# Patient Record
Sex: FEMALE | Race: WHITE | NOT HISPANIC OR LATINO
[De-identification: names, ages, dates, MRNs, and addresses within clinical notes are randomized per-mention and may not be internally consistent; named-entity substitution may affect disease eponyms.]

---

## 2021-08-24 PROBLEM — Z00.00 ENCOUNTER FOR PREVENTIVE HEALTH EXAMINATION: Status: ACTIVE | Noted: 2021-08-24

## 2021-09-14 ENCOUNTER — APPOINTMENT (OUTPATIENT)
Dept: ORTHOPEDIC SURGERY | Facility: CLINIC | Age: 56
End: 2021-09-14
Payer: COMMERCIAL

## 2021-09-14 ENCOUNTER — OUTPATIENT (OUTPATIENT)
Dept: OUTPATIENT SERVICES | Facility: HOSPITAL | Age: 56
LOS: 1 days | End: 2021-09-14
Payer: COMMERCIAL

## 2021-09-14 ENCOUNTER — RESULT REVIEW (OUTPATIENT)
Age: 56
End: 2021-09-14

## 2021-09-14 VITALS
WEIGHT: 157 LBS | OXYGEN SATURATION: 98 % | BODY MASS INDEX: 24.64 KG/M2 | SYSTOLIC BLOOD PRESSURE: 124 MMHG | HEIGHT: 67 IN | HEART RATE: 89 BPM | DIASTOLIC BLOOD PRESSURE: 80 MMHG

## 2021-09-14 DIAGNOSIS — Z60.2 PROBLEMS RELATED TO LIVING ALONE: ICD-10-CM

## 2021-09-14 DIAGNOSIS — Z78.9 OTHER SPECIFIED HEALTH STATUS: ICD-10-CM

## 2021-09-14 DIAGNOSIS — M17.10 UNILATERAL PRIMARY OSTEOARTHRITIS, UNSPECIFIED KNEE: ICD-10-CM

## 2021-09-14 DIAGNOSIS — Z80.0 FAMILY HISTORY OF MALIGNANT NEOPLASM OF DIGESTIVE ORGANS: ICD-10-CM

## 2021-09-14 DIAGNOSIS — Z86.59 PERSONAL HISTORY OF OTHER MENTAL AND BEHAVIORAL DISORDERS: ICD-10-CM

## 2021-09-14 DIAGNOSIS — Z82.62 FAMILY HISTORY OF OSTEOPOROSIS: ICD-10-CM

## 2021-09-14 DIAGNOSIS — M16.10 UNILATERAL PRIMARY OSTEOARTHRITIS, UNSPECIFIED HIP: ICD-10-CM

## 2021-09-14 DIAGNOSIS — Z80.3 FAMILY HISTORY OF MALIGNANT NEOPLASM OF BREAST: ICD-10-CM

## 2021-09-14 DIAGNOSIS — Z80.6 FAMILY HISTORY OF LEUKEMIA: ICD-10-CM

## 2021-09-14 DIAGNOSIS — Z82.61 FAMILY HISTORY OF ARTHRITIS: ICD-10-CM

## 2021-09-14 PROCEDURE — 72170 X-RAY EXAM OF PELVIS: CPT

## 2021-09-14 PROCEDURE — 99204 OFFICE O/P NEW MOD 45 MIN: CPT

## 2021-09-14 PROCEDURE — 73564 X-RAY EXAM KNEE 4 OR MORE: CPT

## 2021-09-14 PROCEDURE — 72170 X-RAY EXAM OF PELVIS: CPT | Mod: 26

## 2021-09-14 PROCEDURE — 73564 X-RAY EXAM KNEE 4 OR MORE: CPT | Mod: 26,50

## 2021-09-14 SDOH — SOCIAL STABILITY - SOCIAL INSECURITY: PROBLEMS RELATED TO LIVING ALONE: Z60.2

## 2021-09-14 NOTE — REVIEW OF SYSTEMS
[Joint Pain] : joint pain [Headache] : headache [Anxiety] : anxiety [Depression] : depression [Negative] : Heme/Lymph

## 2021-09-15 PROBLEM — Z78.9 CURRENT NON-SMOKER: Status: ACTIVE | Noted: 2021-09-14

## 2021-09-15 PROBLEM — M16.10 ARTHRITIS, HIP: Status: RESOLVED | Noted: 2021-09-14 | Resolved: 2021-09-15

## 2021-09-15 PROBLEM — Z78.9 DOES NOT USE ILLICIT DRUGS: Status: ACTIVE | Noted: 2021-09-14

## 2021-09-15 PROBLEM — M17.10 ARTHRITIS OF KNEE: Status: RESOLVED | Noted: 2021-09-14 | Resolved: 2021-09-15

## 2021-09-15 PROBLEM — Z80.6 FAMILY HISTORY OF LEUKEMIA: Status: ACTIVE | Noted: 2021-09-14

## 2021-09-15 PROBLEM — Z82.62 FAMILY HISTORY OF OSTEOPOROSIS: Status: ACTIVE | Noted: 2021-09-14

## 2021-09-15 PROBLEM — Z80.0 FAMILY HISTORY OF MALIGNANT NEOPLASM OF COLON: Status: ACTIVE | Noted: 2021-09-14

## 2021-09-15 PROBLEM — Z78.9 SOCIAL ALCOHOL USE: Status: ACTIVE | Noted: 2021-09-14

## 2021-09-15 PROBLEM — Z80.3 FAMILY HISTORY OF MALIGNANT NEOPLASM OF BREAST: Status: ACTIVE | Noted: 2021-09-14

## 2021-09-15 PROBLEM — Z86.59 HISTORY OF BIPOLAR DISORDER: Status: RESOLVED | Noted: 2021-09-14 | Resolved: 2021-09-15

## 2021-09-15 PROBLEM — Z78.9 EXERCISES DAILY: Status: ACTIVE | Noted: 2021-09-14

## 2021-09-15 PROBLEM — Z82.61 FAMILY HISTORY OF ARTHRITIS: Status: ACTIVE | Noted: 2021-09-14

## 2021-09-15 PROBLEM — Z60.2 LIVES ALONE: Status: ACTIVE | Noted: 2021-09-14

## 2021-09-15 RX ORDER — RISPERIDONE 1 MG/1
1 TABLET ORAL
Refills: 0 | Status: ACTIVE | COMMUNITY

## 2021-09-15 RX ORDER — LAMOTRIGINE 100 MG/1
100 TABLET, EXTENDED RELEASE ORAL
Refills: 0 | Status: ACTIVE | COMMUNITY

## 2021-09-15 RX ORDER — CLONAZEPAM 0.5 MG/1
0.5 TABLET ORAL
Refills: 0 | Status: ACTIVE | COMMUNITY

## 2021-09-15 RX ORDER — CHROMIUM 200 MCG
TABLET ORAL
Refills: 0 | Status: ACTIVE | COMMUNITY

## 2021-09-15 RX ORDER — LAMOTRIGINE 100 MG/1
100 TABLET ORAL
Qty: 180 | Refills: 0 | Status: ACTIVE | COMMUNITY
Start: 2021-08-17

## 2021-09-15 RX ORDER — OLANZAPINE 20 MG/1
TABLET, FILM COATED ORAL
Refills: 0 | Status: ACTIVE | COMMUNITY

## 2021-09-15 NOTE — DISCUSSION/SUMMARY
[de-identified] : 57y/o female with right total hip and right total knee replacements, left knee osteoarthritis\par - PT\par - HEP encouraged\par - Tylenol + diclofenac as needed\par - Discussed left knee injections but she elected to hold off for now\par - Follow up annually with bilateral knee and right hip XRs or earlier as needed. Based on severity of symptoms, she will probably need to undergo left TKA before considering right knee revision (femoral and patellar components).

## 2021-09-15 NOTE — HISTORY OF PRESENT ILLNESS
[___ wks] : [unfilled] week(s) ago [4] : a current pain level of 4/10 [Bending] : worsened by bending [Acetaminophen] : relieved by acetaminophen [Ice] : relieved by ice [Rest] : relieved by rest [de-identified] : 9/14/21: 55y/o female presenting for evaluation of L > R knee pain. Right knee underwent ACL reconstruction in 1983 and was replaced in 2015. She has been experiencing knee hyperextension since the surgery and has persistent soft tissue swelling. Left knee has known osteoarthritis. Last did PT in 2015 immediately following the R TKA. Only other treatments have consisted of Tylenol, cryo, and home exercise (walks 30-60min/day, does light home calisthenics). Reports occasional left knee buckling. Pain on both sides is localized mostly to the anteromedial aspect of both knees. On the right side, there is some radiation down the anteromedial leg to the posteromedial ankle. \par \par PMH sig for bipolar disorder on multiple psychotropic medications. PSH otherwise significant for R ANA in 2017. She had 2x early dislocations following the ANA and was revised to a dual mobility construct later that year with resolution of the instability. She remains very apprehensive regarding extreme motions of the right hip and admits that she has not been pushing the envelope at all with respect to stretching exercises since then. [de-identified] : patient describes pain as radiating and shooting when she feels it.

## 2021-09-15 NOTE — PHYSICAL EXAM
[de-identified] : General appearance: well nourished and hydrated, pleasant, alert and oriented x 3, cooperative.  \par HEENT: normocephalic, EOM intact, wearing mask, external auditory canal clear.  \par Cardiovascular: no lower leg edema, no varicosities, dorsalis pedis pulses palpable and symmetric.  \par Lymphatics: no palpable lymphadenopathy, no lymphedema.  \par Neurologic: sensation is normal, no muscle weakness in upper or lower extremities, patella tendon reflexes present and symmetric.  \par Dermatologic: skin moist, warm, no rash.  \par Spine: cervical spine with normal lordosis and painless range of motion, thoracic spine with normal kyphosis and painless range of motion, lumbosacral spine with normal lordosis and painless range of motion.  \par Gait: normal.\par \par Left knee:\par - Soft tissue swelling: mild\par - Ecchymosis: none\par - Erythema: none\par - Effusion: small\par - Wounds: none\par - Alignment: normal\par - Tenderness: none\par - ROM: 0-130\par - Collateral laxity: none\par - Cruciate laxity: none\par - Popliteal angle (degrees): 60\par - Quad strength: 5/5\par \par Right knee:\par - Soft tissue swelling: moderate\par - Ecchymosis: none\par - Erythema: none\par - Effusion: small\par - Wounds: healed midline incision, short lateral femoral incision\par - Alignment: normal\par - Tenderness: mild anteromedial joint line\par - ROM: 10 hyper - 120\par - Collateral laxity: none\par - Cruciate laxity: about 5mm ant/post translation on flexion drawers\par - Popliteal angle (degrees): 60\par - Quad strength: 5/5\par \par Right hip:\par - Swelling: none\par - Ecchymosis: none\par - Erythema: none\par - Wounds: healed posterolateral incision\par - Tenderness: none\par - ROM: 100 flexion, 0 extension, 10 adduction, 30 abduction, 10 internal rotation, 35-40 external rotation\par - AURORA: apprehensive and stiff\par - FADIR: apprehensive and stiff\par - Tarah: negative\par - Stinchfield: uncomfortable\par - Flexor power: 5/5\par - Abductor power: 5/5 [de-identified] : AP pelvis and 4 views of the bilateral knees (weightbearing AP, weightbearing Subramanian, weightbearing lateral, and Sunrise) were obtained today, interpreted by me, and reviewed with the patient.\par \par Pelvic alignment: normal\par \par Right hip -- ANA in position. Dual mobility construct. All components appear well fixed in acceptable alignment. No evidence of mechanical complication.\par \par Left hip --\par Alignment: normal\par Arthritis: none\par Deformity: none\par Osteonecrosis: none\par \par Right knee -- TKA in position (cemented PS Legion with resurfaced patella). Tibial and femoral components appear to be well fixed in acceptable alignment. Patella was undercut medially and the anterior compartment appears overstuffed. The patella sits at appropriate height and tracks centrally. There is some bony erosion of the lateral patella where is articulates with the femoral trochlea. Retained lateral femoral condylar staples are visualized, consistent with prior ACL reconstruction.\par \par Left knee --\par Alignment: normal\par Arthritis: moderate/severe tricompartmental, worst lateral, KL3-4\par Patellar height: normal\par Patellar tracking: central

## 2021-12-13 ENCOUNTER — RX RENEWAL (OUTPATIENT)
Age: 56
End: 2021-12-13

## 2022-07-03 ENCOUNTER — RX RENEWAL (OUTPATIENT)
Age: 57
End: 2022-07-03

## 2022-09-02 ENCOUNTER — APPOINTMENT (OUTPATIENT)
Dept: ORTHOPEDIC SURGERY | Facility: CLINIC | Age: 57
End: 2022-09-02

## 2022-09-16 ENCOUNTER — APPOINTMENT (OUTPATIENT)
Dept: ORTHOPEDIC SURGERY | Facility: CLINIC | Age: 57
End: 2022-09-16

## 2022-10-01 ENCOUNTER — RX RENEWAL (OUTPATIENT)
Age: 57
End: 2022-10-01

## 2022-10-05 ENCOUNTER — APPOINTMENT (OUTPATIENT)
Dept: ORTHOPEDIC SURGERY | Facility: CLINIC | Age: 57
End: 2022-10-05

## 2022-10-05 ENCOUNTER — RESULT REVIEW (OUTPATIENT)
Age: 57
End: 2022-10-05

## 2022-10-05 ENCOUNTER — OUTPATIENT (OUTPATIENT)
Dept: OUTPATIENT SERVICES | Facility: HOSPITAL | Age: 57
LOS: 1 days | End: 2022-10-05
Payer: COMMERCIAL

## 2022-10-05 VITALS
OXYGEN SATURATION: 97 % | HEART RATE: 86 BPM | BODY MASS INDEX: 24.64 KG/M2 | DIASTOLIC BLOOD PRESSURE: 91 MMHG | WEIGHT: 157 LBS | SYSTOLIC BLOOD PRESSURE: 143 MMHG | HEIGHT: 67 IN

## 2022-10-05 PROCEDURE — 73564 X-RAY EXAM KNEE 4 OR MORE: CPT | Mod: 26,50

## 2022-10-05 PROCEDURE — 99213 OFFICE O/P EST LOW 20 MIN: CPT

## 2022-10-05 PROCEDURE — 73521 X-RAY EXAM HIPS BI 2 VIEWS: CPT | Mod: 26

## 2022-10-05 PROCEDURE — 73564 X-RAY EXAM KNEE 4 OR MORE: CPT

## 2022-10-05 PROCEDURE — 73521 X-RAY EXAM HIPS BI 2 VIEWS: CPT

## 2022-10-05 RX ORDER — DICLOFENAC SODIUM 1% 10 MG/G
1 GEL TOPICAL
Qty: 100 | Refills: 2 | Status: ACTIVE | COMMUNITY
Start: 2022-10-05 | End: 1900-01-01

## 2022-10-06 NOTE — HISTORY OF PRESENT ILLNESS
[de-identified] : R TKA 2015, OSH\par R ANA 2017, OSH, with 2x early dislocations, subsequently revised to DM\par \par 10/5/22: 58 y/o female presenting for f/u of right TKA, right ANA, and left knee OA. She was last seen here about 1 year ago. She reports that the knees are overall relatively painless. She is able to continue with her usual exercise program of up to 45-60 minutes of walking daily. She participated for some period of time in PT but stopped after her insurance cut her off and she is continuing to do an appropriate home exercise program. She takes diclofenac up to once daily in the mornings as needed. She reports a sensation of some muscular weakness/"looseness" of right hip which can be uncomfortable. \par \par 9/14/21: 55y/o female presenting for evaluation of L > R knee pain. Right knee underwent ACL reconstruction in 1983 and was replaced in 2015. She has been experiencing knee hyperextension since the surgery and has persistent soft tissue swelling. Left knee has known osteoarthritis. Last did PT in 2015 immediately following the R TKA. Only other treatments have consisted of Tylenol, cryo, and home exercise (walks 30-60min/day, does light home calisthenics). Reports occasional left knee buckling. Pain on both sides is localized mostly to the anteromedial aspect of both knees. On the right side, there is some radiation down the anteromedial leg to the posteromedial ankle. \par \par PMH sig for bipolar disorder on multiple psychotropic medications. PSH otherwise significant for R ANA in 2017. She had 2x early dislocations following the ANA and was revised to a dual mobility construct later that year with resolution of the instability. She remains very apprehensive regarding extreme motions of the right hip and admits that she has not been pushing the envelope at all with respect to stretching exercises since then.

## 2022-10-06 NOTE — DISCUSSION/SUMMARY
[de-identified] : 58 y/o female with well-functioning right ANA and right TKA as well as minimally symptomatic left knee OA\par - She is doing well. I do not feel that we need to discuss any further surgical intervention at this time\par - She will continue with a PT program as well as a dedicated home exercise program for both the hip and the knees. \par - She was encouraged to continue with her once daily diclofenac as well as topical diclofenac over the trochanteric bursitis on the right side. \par - We will continue to f/u annually with repeat b/l hip and knee XR or earlier as needed, particularly for any persistent or worsening left knee pain.

## 2022-10-06 NOTE — END OF VISIT
[FreeTextEntry3] : All medical record entries made by the Scribe were at my, Dr. Bony Sheppard, direction and personally dictated by me on 10/05/2022. I have reviewed the chart and agree that the record accurately reflects my personal performance of the history, physical exam, assessment and plan. I have also personally directed, reviewed, and agreed with the chart.

## 2022-10-06 NOTE — ADDENDUM
[FreeTextEntry1] : Documented by Damari Vasquez acting as a scribe for Dr. Bony Sheppard on 10/05/2022.

## 2022-10-06 NOTE — PHYSICAL EXAM
[de-identified] : General appearance: well nourished and hydrated, pleasant, alert and oriented x 3, cooperative.  \par HEENT: normocephalic, EOM intact, wearing mask, external auditory canal clear.  \par Cardiovascular: no lower leg edema, no varicosities, dorsalis pedis pulses palpable and symmetric.  \par Lymphatics: no palpable lymphadenopathy, no lymphedema.  \par Neurologic: sensation is normal, no muscle weakness in upper or lower extremities, patella tendon reflexes present and symmetric.  \par Dermatologic: skin moist, warm, no rash.  \par Spine: cervical spine with normal lordosis and painless range of motion, thoracic spine with normal kyphosis and painless range of motion, lumbosacral spine with normal lordosis and painless range of motion.  \par Gait: normal.\par \par Left knee:\par - Soft tissue swelling: none\par - Ecchymosis: none\par - Erythema: none\par - Effusion: none\par - Wounds: none\par - Alignment: normal\par - Tenderness: none\par - ROM: 0-135\par - Collateral laxity: none\par - Cruciate laxity: none\par - Popliteal angle (degrees): 30\par - Quad strength: 5/5\par \par Right knee:\par - Soft tissue swelling: moderate\par - Ecchymosis: none\par - Erythema: none\par - Effusion: small\par - Wounds: well-healed anterior surgical incision which is benign appearing \par - Alignment: mild valgus\par - Tenderness: none\par - ROM: 10 hyper - 125\par - Collateral laxity: mild medial ligamentous laxity with a firm end point \par - Cruciate laxity: stable\par - Popliteal angle (degrees): 30\par - Quad strength: 5/5\par \par Right hip:\par - Swelling: none\par - Ecchymosis: none\par - Erythema: none\par - Wounds: healed posterolateral incision as well as pelvic stab incisions which are benign appearing \par - Tenderness: mild TTP along greater trochanter, nontender over groin\par - ROM: 105 flexion, 0 extension, 15 adduction, 35 abduction, 15 internal rotation, 35 external rotation\par - AURORA: negative\par - FADIR: lateral trochanteric pain\par - Tarah: negative\par - Stinchfield: negative \par - Flexor power: 5/5\par - Abductor power: 5/5 PAST MEDICAL HISTORY:  No pertinent past medical history      [de-identified] : AP pelvis and 4 views of the bilateral knees (weightbearing AP, weightbearing Subramanian, weightbearing lateral, and Sunrise) were interpreted by me and reviewed with the patient.\par \par Location of imaging: [facility]\par Date of exam: 10/5/22\par \par Pelvic alignment: normal, there is evidence of degenerative lumbar scoliosis\par \par Right hip -- well-fixed AAN with unchanged alignment as compared to previous imaging without evidence of mechanical complication\par \par Left hip -- \par Arthritis: none\par Deformity: cam\par Osteonecrosis: none\par \par Right knee -- right TKA with retained femoral ACL staples, all hardware in unchanged position as compared to previous imaging without evidence of new mechanical complication \par Alignment: valgus \par Patellar height: normal\par Patellar tracking: central with grossly unchanged appearance of lateral patellar facet bone loss where it articulates with the lateral trochlear aspect of the femoral component\par \par Left knee -- \par Alignment: normal\par Arthritis: tricompartmental OA most pronounced laterally, KL 3-4\par Patellar height: normal\par Patellar tracking: central

## 2023-04-07 ENCOUNTER — RX RENEWAL (OUTPATIENT)
Age: 58
End: 2023-04-07

## 2023-06-20 ENCOUNTER — RESULT REVIEW (OUTPATIENT)
Age: 58
End: 2023-06-20

## 2023-06-20 ENCOUNTER — OUTPATIENT (OUTPATIENT)
Dept: OUTPATIENT SERVICES | Facility: HOSPITAL | Age: 58
LOS: 1 days | End: 2023-06-20
Payer: COMMERCIAL

## 2023-06-20 ENCOUNTER — APPOINTMENT (OUTPATIENT)
Dept: ORTHOPEDIC SURGERY | Facility: CLINIC | Age: 58
End: 2023-06-20
Payer: MEDICAID

## 2023-06-20 VITALS
HEART RATE: 81 BPM | BODY MASS INDEX: 24.64 KG/M2 | HEIGHT: 67 IN | DIASTOLIC BLOOD PRESSURE: 98 MMHG | WEIGHT: 157 LBS | OXYGEN SATURATION: 97 % | SYSTOLIC BLOOD PRESSURE: 163 MMHG

## 2023-06-20 DIAGNOSIS — Z96.641 AFTERCARE FOLLOWING JOINT REPLACEMENT SURGERY: ICD-10-CM

## 2023-06-20 DIAGNOSIS — Z96.653 AFTERCARE FOLLOWING JOINT REPLACEMENT SURGERY: ICD-10-CM

## 2023-06-20 DIAGNOSIS — Z47.1 AFTERCARE FOLLOWING JOINT REPLACEMENT SURGERY: ICD-10-CM

## 2023-06-20 DIAGNOSIS — M17.12 UNILATERAL PRIMARY OSTEOARTHRITIS, LEFT KNEE: ICD-10-CM

## 2023-06-20 DIAGNOSIS — Z96.651 AFTERCARE FOLLOWING JOINT REPLACEMENT SURGERY: ICD-10-CM

## 2023-06-20 PROCEDURE — 73521 X-RAY EXAM HIPS BI 2 VIEWS: CPT

## 2023-06-20 PROCEDURE — 73564 X-RAY EXAM KNEE 4 OR MORE: CPT

## 2023-06-20 PROCEDURE — 99214 OFFICE O/P EST MOD 30 MIN: CPT

## 2023-06-20 PROCEDURE — 73564 X-RAY EXAM KNEE 4 OR MORE: CPT | Mod: 26,50

## 2023-06-20 PROCEDURE — 73521 X-RAY EXAM HIPS BI 2 VIEWS: CPT | Mod: 26

## 2023-06-22 PROBLEM — Z47.1 AFTERCARE FOLLOWING RIGHT HIP JOINT REPLACEMENT SURGERY: Noted: 2021-09-14

## 2023-06-22 PROBLEM — Z47.1 AFTERCARE FOLLOWING RIGHT KNEE JOINT REPLACEMENT SURGERY: Noted: 2021-09-14

## 2023-06-22 PROBLEM — Z47.1 AFTERCARE FOLLOWING BILATERAL KNEE JOINT REPLACEMENT SURGERY: Noted: 2023-06-22

## 2023-06-22 PROBLEM — M17.12 PRIMARY OSTEOARTHRITIS OF LEFT KNEE: Noted: 2021-09-14

## 2023-06-22 NOTE — END OF VISIT
[FreeTextEntry3] : All medical record entries made by the Scribe were at my, Dr. Bony Sheppard, direction and personally dictated by me on 06/20/2023. I have reviewed the chart and agree that the record accurately reflects my personal performance of the history, physical exam, assessment and plan. I have also personally directed, reviewed, and agreed with the chart.

## 2023-06-22 NOTE — DISCUSSION/SUMMARY
[de-identified] : 57 y/o female s/p right ANA and right TKA with left hip trochanteric bursitis and at this time minimally symptomatic left knee OA. \par - She is doing well at this time. She has relatively mild b/l trochanteric bursitis which she feels is under adequate control without the need for invasive treatments. She is maintaining what I would consider to be a robust HEP. I did recommend that she specifically add in posterior chain stretching exercises which I demonstrated for her, but beyond that I think no additional intervention is required at this point. \par - We reviewed that if the left knee OA becomes more symptomatic then this can be addressed with injections or surgery which are not necessary at this point. \par - She will continue to f/u annually with repeat bilateral hip and knee XR or earlier as needed for new or worsening symptoms.

## 2023-06-22 NOTE — ADDENDUM
[FreeTextEntry1] : Documented by Damari Vasquez acting as a scribe for Dr. Bony Sheppard on 06/20/2023.

## 2023-06-22 NOTE — PHYSICAL EXAM
[de-identified] : General appearance: well nourished and hydrated, pleasant, alert and oriented x 3, cooperative.  \par HEENT: normocephalic, EOM intact, wearing mask, external auditory canal clear.  \par Cardiovascular: no lower leg edema, no varicosities, dorsalis pedis pulses palpable and symmetric.  \par Lymphatics: no palpable lymphadenopathy, no lymphedema.  \par Neurologic: sensation is normal, no muscle weakness in upper or lower extremities, patella tendon reflexes present and symmetric.  \par Dermatologic: skin moist, warm, no rash.  \par Spine: cervical spine with normal lordosis and painless range of motion, thoracic spine with normal kyphosis and painless range of motion, lumbosacral spine with normal lordosis and painless range of motion.  No tenderness to palpation along midline spine and paraspinal musculature.  Sacroiliac joints nontender bilaterally. Negative SLR and crossed SLR tests bilaterally.\par Gait: no assistive device, stable nonantalgic gait pattern \par \par Left knee: \par - Focal soft tissue swelling: none\par - Ecchymosis: none\par - Erythema: none\par - Effusion: trace, no Baker's cyst\par - Wounds: none\par - Alignment: normal\par - Tenderness: none along the pes anserine bursa\par - ROM: 0-130\par - Collateral laxity: none\par - Cruciate laxity: none\par - Popliteal angle (degrees): 40\par - Quad strength: 5/5\par - She has medial hamstring snapping which is elicitable going from 10-25 degrees of knee flexion which does not appear to be painful to her. \par \par Right knee:\par - Focal soft tissue swelling: none\par - Ecchymosis: none\par - Erythema: none\par - Effusion: trace, no Baker's cyst\par - Wounds: well healed diagonally midline incision, benign appearing \par - Alignment: valgus\par - Tenderness: none\par - ROM: 0-120\par - Collateral laxity: none\par - Cruciate laxity: none\par - Popliteal angle (degrees): 40\par - Quad strength: 5/5\par \par Limb lengths: clinically RLE approximately 6-8 mm longer than LLE\par \par Left hip:\par - Focal soft tissue swelling: none\par - Ecchymosis: none\par - Erythema: none\par - Wounds: none\par - Tenderness: TTP over the lateral trochanter, no TTP over the anterior hip\par - ROM: \par   - Flexion: 120\par   - Extension: 0\par   - Adduction: 15\par   - Abduction: 40\par   - Internal rotation in 90 degrees of hip flexion: 10\par   - External rotation in 90 degrees of hip flexion: 30\par - AURORA: elicits lateral hip pain\par - FADIR: elicits lateral hip pain\par - Tarah: negative\par - Stinchfield: negative\par - Flexor power: 5/5\par - Abductor power: 5/5\par \par Right hip: \par - Focal soft tissue swelling: none\par - Ecchymosis: none\par - Erythema: none\par - Wounds: well healed posterolateral incision, benign appearing\par - Tenderness: none\par - ROM: \par   - Flexion: 105\par   - Extension: 0\par   - Adduction: 15\par   - Abduction: 20\par   - Internal rotation in 90 degrees of hip flexion: 15\par   - External rotation in 90 degrees of hip flexion: 25\par - AURORA: stiff but negative \par - FADIR: stiff but negative \par - Tarah: negative\par - Stinchfield: negative\par - Flexor power: 5/5\par - Abductor power: 5/5 [de-identified] : B/l hip and knee XR were taken. These demonstrate stable appearance of her right ANA as compared to previous imaging without evidence of mechanical complication. Left hip demonstrates minimal OA, Tonnis 0-1 with no associated osteonecrosis or deformity. Right knee demonstrates right TKA in stable position with retained staples, no evidence of mechanical complication as compared to previous imaging. Patella sits at normal height and tracks centrally with increased lateral tilt. As before, the patella is articulating with the lateral femoral condyle of the implant. Left knee demonstrates normal alignment, tricompartmental OA with an ACL deficient pattern and lateral greater than medial compartment wear, KL 3-4. Patella sits at normal height and tracks centrally.

## 2023-06-22 NOTE — HISTORY OF PRESENT ILLNESS
[de-identified] : R TKA 2015, OSH\par R ANA 2017, OSH, with 2x early dislocations, subsequently revised to DM\par \par 06/20/2023: 59 y/o female f/u for annual f/u of right ANA and right TKA. She states today she has pain to the left hip localized to the lateral greater trochanter with radiation to her midthigh. She rates her pain as 3-4/10. She takes oral and topical diclofenac. She has been doing her HEP and notes muscle pain of the right hip. She denies right knee problems. She no longer participates in PT due to lack of insurance coverage. She denies low back pain. She states her hip pain is primarily provoked by walking though she denies walking distance limitation. \par \par 10/5/22: 58 y/o female presenting for f/u of right TKA, right ANA, and left knee OA. She was last seen here about 1 year ago. She reports that the knees are overall relatively painless. She is able to continue with her usual exercise program of up to 45-60 minutes of walking daily. She participated for some period of time in PT but stopped after her insurance cut her off and she is continuing to do an appropriate home exercise program. She takes diclofenac up to once daily in the mornings as needed. She reports a sensation of some muscular weakness/"looseness" of right hip which can be uncomfortable. \par \par 9/14/21: 57y/o female presenting for evaluation of L > R knee pain. Right knee underwent ACL reconstruction in 1983 and was replaced in 2015. She has been experiencing knee hyperextension since the surgery and has persistent soft tissue swelling. Left knee has known osteoarthritis. Last did PT in 2015 immediately following the R TKA. Only other treatments have consisted of Tylenol, cryo, and home exercise (walks 30-60min/day, does light home calisthenics). Reports occasional left knee buckling. Pain on both sides is localized mostly to the anteromedial aspect of both knees. On the right side, there is some radiation down the anteromedial leg to the posteromedial ankle. \par \par PMH sig for bipolar disorder on multiple psychotropic medications. PSH otherwise significant for R ANA in 2017. She had 2x early dislocations following the ANA and was revised to a dual mobility construct later that year with resolution of the instability. She remains very apprehensive regarding extreme motions of the right hip and admits that she has not been pushing the envelope at all with respect to stretching exercises since then.

## 2023-08-30 ENCOUNTER — RX RENEWAL (OUTPATIENT)
Age: 58
End: 2023-08-30

## 2023-11-27 ENCOUNTER — RX RENEWAL (OUTPATIENT)
Age: 58
End: 2023-11-27

## 2023-12-26 ENCOUNTER — OUTPATIENT (OUTPATIENT)
Dept: OUTPATIENT SERVICES | Facility: HOSPITAL | Age: 58
LOS: 1 days | End: 2023-12-26
Payer: COMMERCIAL

## 2023-12-26 ENCOUNTER — APPOINTMENT (OUTPATIENT)
Dept: ORTHOPEDIC SURGERY | Facility: CLINIC | Age: 58
End: 2023-12-26
Payer: MEDICAID

## 2023-12-26 ENCOUNTER — RESULT REVIEW (OUTPATIENT)
Age: 58
End: 2023-12-26

## 2023-12-26 VITALS
DIASTOLIC BLOOD PRESSURE: 106 MMHG | HEIGHT: 67 IN | OXYGEN SATURATION: 97 % | WEIGHT: 157 LBS | SYSTOLIC BLOOD PRESSURE: 169 MMHG | BODY MASS INDEX: 24.64 KG/M2 | HEART RATE: 92 BPM

## 2023-12-26 VITALS — OXYGEN SATURATION: 97 % | WEIGHT: 157 LBS | HEIGHT: 67 IN | BODY MASS INDEX: 24.64 KG/M2 | HEART RATE: 92 BPM

## 2023-12-26 DIAGNOSIS — Z96.651 AFTERCARE FOLLOWING JOINT REPLACEMENT SURGERY: ICD-10-CM

## 2023-12-26 DIAGNOSIS — M17.12 UNILATERAL PRIMARY OSTEOARTHRITIS, LEFT KNEE: ICD-10-CM

## 2023-12-26 DIAGNOSIS — Z96.641 AFTERCARE FOLLOWING JOINT REPLACEMENT SURGERY: ICD-10-CM

## 2023-12-26 DIAGNOSIS — Z47.1 AFTERCARE FOLLOWING JOINT REPLACEMENT SURGERY: ICD-10-CM

## 2023-12-26 DIAGNOSIS — M70.61 TROCHANTERIC BURSITIS, RIGHT HIP: ICD-10-CM

## 2023-12-26 PROCEDURE — 73521 X-RAY EXAM HIPS BI 2 VIEWS: CPT | Mod: 26

## 2023-12-26 PROCEDURE — 73564 X-RAY EXAM KNEE 4 OR MORE: CPT

## 2023-12-26 PROCEDURE — 99214 OFFICE O/P EST MOD 30 MIN: CPT

## 2023-12-26 PROCEDURE — 73521 X-RAY EXAM HIPS BI 2 VIEWS: CPT

## 2023-12-26 PROCEDURE — 73564 X-RAY EXAM KNEE 4 OR MORE: CPT | Mod: 26,50

## 2023-12-27 RX ORDER — LOSARTAN POTASSIUM 25 MG/1
25 TABLET, FILM COATED ORAL
Qty: 30 | Refills: 0 | Status: ACTIVE | COMMUNITY
Start: 2023-12-15

## 2023-12-27 RX ORDER — BENZONATATE 200 MG/1
200 CAPSULE ORAL
Qty: 20 | Refills: 0 | Status: ACTIVE | COMMUNITY
Start: 2023-11-26

## 2023-12-27 RX ORDER — AZITHROMYCIN 250 MG/1
250 TABLET, FILM COATED ORAL
Qty: 6 | Refills: 0 | Status: ACTIVE | COMMUNITY
Start: 2023-11-26

## 2023-12-27 RX ORDER — CLONAZEPAM 0.25 MG/1
0.25 TABLET, ORALLY DISINTEGRATING ORAL
Qty: 30 | Refills: 0 | Status: ACTIVE | COMMUNITY
Start: 2023-10-17

## 2023-12-27 NOTE — HISTORY OF PRESENT ILLNESS
[de-identified] : R TKA 2015, OSH R ANA 2017, OSH, with 2x early dislocations, subsequently revised to DM  Follow-up 12/26/2023: 58 year old female presenting for a follow-up evaluation of right total hip replacement. Patients states she's been having some right lateral hip pain for the past few weeks now. Localized to the right greater trochanter, denies any radiation or symptoms or any anterior groin pain. She states her pain is primarily provoked by changing position, sitting for a long period of time, as well as changing positions from sitting to standing. She ia taking Tylenol as needed.   10/5/22: 58 y/o female presenting for f/u of right TKA, right ANA, and left knee OA. She was last seen here about 1 year ago. She reports that the knees are overall relatively painless. She is able to continue with her usual exercise program of up to 45-60 minutes of walking daily. She participated for some period of time in PT but stopped after her insurance cut her off and she is continuing to do an appropriate home exercise program. She takes diclofenac up to once daily in the mornings as needed. She reports a sensation of some muscular weakness/"looseness" of right hip which can be uncomfortable.   9/14/21: 57y/o female presenting for evaluation of L > R knee pain. Right knee underwent ACL reconstruction in 1983 and was replaced in 2015. She has been experiencing knee hyperextension since the surgery and has persistent soft tissue swelling. Left knee has known osteoarthritis. Last did PT in 2015 immediately following the R TKA. Only other treatments have consisted of Tylenol, cryo, and home exercise (walks 30-60min/day, does light home calisthenics). Reports occasional left knee buckling. Pain on both sides is localized mostly to the anteromedial aspect of both knees. On the right side, there is some radiation down the anteromedial leg to the posteromedial ankle.   PMH sig for bipolar disorder on multiple psychotropic medications. PSH otherwise significant for R ANA in 2017. She had 2x early dislocations following the ANA and was revised to a dual mobility construct later that year with resolution of the instability. She remains very apprehensive regarding extreme motions of the right hip and admits that she has not been pushing the envelope at all with respect to stretching exercises since then.

## 2023-12-27 NOTE — PHYSICAL EXAM
[de-identified] : General appearance: well nourished and hydrated, pleasant, alert and oriented x 3, cooperative.   HEENT: normocephalic, EOM intact, wearing mask, external auditory canal clear.   Cardiovascular: no lower leg edema, no varicosities, dorsalis pedis pulses palpable and symmetric.   Lymphatics: no palpable lymphadenopathy, no lymphedema.   Neurologic: sensation is normal, no muscle weakness in upper or lower extremities, patella tendon reflexes present and symmetric.   Dermatologic: skin moist, warm, no rash.   Spine: cervical spine with normal lordosis and painless range of motion, thoracic spine with normal kyphosis and painless range of motion, lumbosacral spine with normal lordosis and painless range of motion. Minimal tenderness to palpation along midline spine and paraspinal musculature.  Mild right sacroiliac joint tenderness, no left sacroiliac joint tenderness. Negative SLR and crossed SLR tests bilaterally. Gait: no assistive device, stable nonantalgic gait pattern   Limb lengths: clinically RLE approximately 5 mm longer than LLE  Right hip: - Focal soft tissue swelling: none - Ecchymosis: none - Erythema: none - Wounds: well healed posterior lateral incision benign appearing. - Tenderness: mild discomfort as well as some fluctuating swelling over the lateral trochanter, no TTP over the anterior hip. No TTP at the ischial tuberosity. - ROM:    - Flexion: 100   - Extension: 0   - Adduction: 15   - Abduction: 30   - Internal rotation in 90 degrees of hip flexion: 10   - External rotation in 90 degrees of hip flexion: 15 - AURORA: elicits lateral hip pain - FADIR: elicits lateral hip pain - Tarah: negative - [place]: negative - Flexor power: 4+/5 - Abductor power: 4+/5 [de-identified] : 12/26/2023: B/l hip and knee XR were taken. Pelvic alignment is normal. These demonstrate stable appearance of her right ANA as compared to previous imaging without evidence of mechanical complication. Left hip demonstrates mild OA, Tonnis 0-1 with no associated osteonecrosis or deformity. Right knee demonstrates right TKA in stable position with retained lateral distal femur staples, no evidence of interval mechanical complication. There does appear to be some medial sided gapping between the tibial and femoral components on the Subramanian view. Patella sits at normal height and tracks centrally with increased lateral tilt. Overall appearance of her imaging is stable as compared to the June 2023 x-rays. Left knee demonstrates varus alignment, tricompartmental OA most pronounced laterally with bone-on-bone articulation, KL 4. Patella sits at normal height and tracks centrally.

## 2023-12-27 NOTE — DISCUSSION/SUMMARY
[de-identified] : Imp: 57 y/o female s/p right ANA with recurrent right trochanteric bursitis as well as right total knee arthroplasty and at this time asymptomatic left knee osteoarthritis. - I recommend that we continue with conservative treatment options. - Physical therapy. - Home exercise program. - Tylenol and Diclofenac as needed. - She'll follow up in two months with no new x-rays needed. - Depending on her response to the above at that time, we may consider a right trochanteric cortisone injection.

## 2023-12-27 NOTE — END OF VISIT
[FreeTextEntry3] : All medical record entries made by the Scribe were at my, Dr. Bony Sheppard, direction and personally dictated by me on 12/26/2023. I have reviewed the chart and agree that the record accurately reflects my personal performance of the history, physical exam, assessment and plan. I have also personally directed, reviewed, and agreed with the chart.

## 2024-04-02 ENCOUNTER — RX RENEWAL (OUTPATIENT)
Age: 59
End: 2024-04-02

## 2024-07-01 ENCOUNTER — RX RENEWAL (OUTPATIENT)
Age: 59
End: 2024-07-01

## 2024-09-30 ENCOUNTER — RX RENEWAL (OUTPATIENT)
Age: 59
End: 2024-09-30

## 2025-01-27 ENCOUNTER — RX RENEWAL (OUTPATIENT)
Age: 60
End: 2025-01-27

## 2025-02-27 ENCOUNTER — RX RENEWAL (OUTPATIENT)
Age: 60
End: 2025-02-27

## 2025-03-26 ENCOUNTER — RX RENEWAL (OUTPATIENT)
Age: 60
End: 2025-03-26